# Patient Record
Sex: FEMALE | Race: WHITE | NOT HISPANIC OR LATINO
[De-identification: names, ages, dates, MRNs, and addresses within clinical notes are randomized per-mention and may not be internally consistent; named-entity substitution may affect disease eponyms.]

---

## 2020-08-19 ENCOUNTER — FORM ENCOUNTER (OUTPATIENT)
Age: 20
End: 2020-08-19

## 2020-09-30 ENCOUNTER — FORM ENCOUNTER (OUTPATIENT)
Age: 20
End: 2020-09-30

## 2021-05-18 PROBLEM — Z00.00 ENCOUNTER FOR PREVENTIVE HEALTH EXAMINATION: Status: ACTIVE | Noted: 2021-05-18

## 2021-06-16 ENCOUNTER — APPOINTMENT (OUTPATIENT)
Dept: BREAST CENTER | Facility: CLINIC | Age: 21
End: 2021-06-16
Payer: COMMERCIAL

## 2021-06-16 VITALS
DIASTOLIC BLOOD PRESSURE: 68 MMHG | WEIGHT: 118 LBS | TEMPERATURE: 98 F | BODY MASS INDEX: 21.71 KG/M2 | SYSTOLIC BLOOD PRESSURE: 100 MMHG | HEIGHT: 62 IN

## 2021-06-16 DIAGNOSIS — D24.2 BENIGN NEOPLASM OF LEFT BREAST: ICD-10-CM

## 2021-06-16 DIAGNOSIS — Z78.9 OTHER SPECIFIED HEALTH STATUS: ICD-10-CM

## 2021-06-16 PROCEDURE — 99203 OFFICE O/P NEW LOW 30 MIN: CPT

## 2021-06-17 PROBLEM — Z78.9 NO PERTINENT PAST SURGICAL HISTORY: Status: RESOLVED | Noted: 2021-06-17 | Resolved: 2021-06-17

## 2021-06-17 PROBLEM — Z78.9 NO PERTINENT PAST MEDICAL HISTORY: Status: RESOLVED | Noted: 2021-06-17 | Resolved: 2021-06-17

## 2021-06-17 NOTE — PHYSICAL EXAM
[Normocephalic] : normocephalic [Atraumatic] : atraumatic [EOMI] : extra ocular movement intact [No Supraclavicular Adenopathy] : no supraclavicular adenopathy [No Cervical Adenopathy] : no cervical adenopathy [No dominant masses] : no dominant masses in right breast  [No Nipple Retraction] : no left nipple retraction [No Nipple Discharge] : no left nipple discharge [No Axillary Lymphadenopathy] : no left axillary lymphadenopathy [Soft] : abdomen soft [Not Tender] : non-tender [No Edema] : no edema [No Rashes] : no rashes [No Ulceration] : no ulceration [de-identified] : no suspicious abnormalities  [de-identified] : in the LIQ, she has a 1.5 cm mobile mass which is likely her biopsy proven fibroadenoma

## 2021-06-17 NOTE — HISTORY OF PRESENT ILLNESS
[FreeTextEntry1] :  Jennifer is a 20F with a left breast fibroadenoma. \par \par She first palpated a left breast mass about 1 year prior.  It does not cause her pain, it has not changed in size and there are no overlying skin changes.  She does not feel any abnormalities within her right breast and otherwise denies any other breast related complaints. \par \par Her work up was as follows: \par 2020 -- L breast US \par -@4N2, 2 x 0.5 x 2 cm mass \par BIRADS 4 \par \par 2020 -- L US CNBx @4N2 \par -fibroadenoma \par \par HISTORICAL RISK FACTORS: \par -no prior breast biopsies or surgeries \par -no family history of breast or ovarian cancer \par -\par -no prior OCP use \par -no gyn surgeries\par

## 2021-06-17 NOTE — PAST MEDICAL HISTORY
[Menstruating] : The patient is menstruating [Menarche Age ____] : age at menarche was [unfilled] [Definite ___ (Date)] : the last menstrual period was [unfilled] [Total Preg ___] : G[unfilled] [History of Hormone Replacement Treatment] : has no history of hormone replacement treatment [FreeTextEntry5] : denies  [FreeTextEntry6] : denies [FreeTextEntry8] : n/a  [FreeTextEntry7] : denies

## 2021-06-17 NOTE — REVIEW OF SYSTEMS
[As Noted in HPI] : as noted in HPI [Breast Lump] : breast lump [Negative] : Heme/Lymph [Fever] : no fever [Chills] : no chills [Skin Lesions] : no skin lesions [Skin Wound] : no skin wound [Breast Pain] : no breast pain

## 2021-06-17 NOTE — ASSESSMENT
[FreeTextEntry1] : Jennifer is a 20 F left breast fibroadenoma. \par \par On exam, in her left breast, in the LIQ, she has a 1.5 cm mobile mass which is likely her biopsy proven fibroadenoma, but no other suspicious abnormalities were palpated. \par \par Her most recent imaging was a b/l breast US on 8/20/2020 which revealed in the left breast @4N2, an oval hypoechoic mass measuring 2 x 0.5 x 2 cm which was her biopsy proven fibroadenoma. \par \par She will be due for a L breast US on 8/20/2021.  This will be scheduled for her today.  If unrevealing, she can follow up in 1 year for a CBE. \par \par We discussed fibroadenomas.  These are benign lesions without any malignant potential.  They are hormonally influenced and can increase or decrease in size and can also regress spontaneously.  They are considered proliferative lesions without atypia.  Patients with these lesions have been found to have a slightly increased relative risk of breast cancer compared to the reference population.  Surgical excision is recommended when the diagnosis in unclear, the lesion is causing pain or breast deformity, or if it is rapidly enlarging. \par \par The reason that we recommend surgical excision for a rapidly enlarging fibroadenoma is because there is a possibility that this could be a Phyllodes Tumor.  The treatment of this lesion is wide local excision with 1 cm margins.  A sentinel lymph node would not be necessary as this disease very rarely spreads to the lymph nodes. \par \par At this time, she is asymptomatic from her fibroadenoma, and on exam, it does appear to be slightly smaller than what was measured on US, so no acute surgical intervention is indicated. \par \par We will continue to monitor this. \par \par She is otherwise at an average risk for breast cancer and should start annual screening mammograms at the age of 40. \par \par All of her questions were answered.  She knows to call with any further questions or concerns. \par \par PLAN: \par -L breast US on 8/20/2021 \par -if unrevealing, f/up in 1 year

## 2022-03-31 ENCOUNTER — NON-APPOINTMENT (OUTPATIENT)
Age: 22
End: 2022-03-31

## 2022-03-31 ENCOUNTER — APPOINTMENT (OUTPATIENT)
Dept: OBGYN | Facility: CLINIC | Age: 22
End: 2022-03-31
Payer: COMMERCIAL

## 2022-03-31 VITALS
TEMPERATURE: 98.8 F | BODY MASS INDEX: 23 KG/M2 | SYSTOLIC BLOOD PRESSURE: 135 MMHG | HEART RATE: 111 BPM | HEIGHT: 62 IN | DIASTOLIC BLOOD PRESSURE: 81 MMHG | WEIGHT: 125 LBS

## 2022-03-31 DIAGNOSIS — Z01.411 ENCOUNTER FOR GYNECOLOGICAL EXAMINATION (GENERAL) (ROUTINE) WITH ABNORMAL FINDINGS: ICD-10-CM

## 2022-03-31 DIAGNOSIS — N94.4 PRIMARY DYSMENORRHEA: ICD-10-CM

## 2022-03-31 LAB
BILIRUB UR QL STRIP: NEGATIVE
CLARITY UR: CLEAR
COLLECTION METHOD: NORMAL
GLUCOSE UR-MCNC: NEGATIVE
HCG UR QL: 0.2 EU/DL
HGB UR QL STRIP.AUTO: NORMAL
KETONES UR-MCNC: NEGATIVE
LEUKOCYTE ESTERASE UR QL STRIP: NEGATIVE
NITRITE UR QL STRIP: NEGATIVE
PH UR STRIP: 6
PROT UR STRIP-MCNC: NEGATIVE
SP GR UR STRIP: 1.01

## 2022-03-31 PROCEDURE — 81003 URINALYSIS AUTO W/O SCOPE: CPT | Mod: QW

## 2022-03-31 PROCEDURE — 99395 PREV VISIT EST AGE 18-39: CPT

## 2022-03-31 NOTE — HISTORY OF PRESENT ILLNESS
[Definite ___ (Date)] : the last menstrual period was [unfilled] [Normal Amount/Duration] : it was of a normal amount and duration [Irregular Cycle Intervals] : are  irregular [Regular Duration] : has been regular [Dysmenorrhea] : dysmenorrhea [Condoms] : uses condoms [Y] : Patient is sexually active [N] : Patient denies prior pregnancies [TextBox_102] : skipped 2 months   see HPI [LMPDate] : 3/21/22 [MensesFreq] : 28 [MensesLength] : 5 [MensesAmount] : mod [Menstrual Cramps] : menstrual cramps [FreeTextEntry1] : 3/21/22 [No] : Patient does not have concerns regarding sex [Currently Active] : currently active [Men] : men

## 2022-03-31 NOTE — DISCUSSION/SUMMARY
[FreeTextEntry1] : pt does not meet the criteria for PCOS sonographically or on PE\par she does not want to start OCPs to manage her dysmenorrhea/irreg cycles\par recommended ibuprofen 600-800mg q 8 hrs prn for pain\par pt will f/u if no improvement with NSAIDs

## 2022-03-31 NOTE — PROCEDURE
[Cervical Pap Smear] : cervical Pap smear [Liquid Base] : liquid base [GC & Chlamydia via Pap] : GC & Chlamydia via Pap [Tolerated Well] : the patient tolerated the procedure well [No Complications] : there were no complications [Abnormal Uterine Bleeding] : abnormal uterine bleeding [Transvaginal Ultrasound] : transvaginal ultrasound [Anteverted] : anteverted [FreeTextEntry9] : skipped 2 months [FreeTextEntry5] : 35.92cc [FreeTextEntry7] : 4.25cc [FreeTextEntry8] : 4.80cc [FreeTextEntry4] : no evidence of PCOS

## 2022-04-05 LAB
C TRACH RRNA SPEC QL NAA+PROBE: NOT DETECTED
HPV HIGH+LOW RISK DNA PNL CVX: NOT DETECTED
N GONORRHOEA RRNA SPEC QL NAA+PROBE: NOT DETECTED
SOURCE AMPLIFICATION: NORMAL

## 2022-04-11 LAB — CYTOLOGY CVX/VAG DOC THIN PREP: NORMAL

## 2023-11-01 ENCOUNTER — NON-APPOINTMENT (OUTPATIENT)
Age: 23
End: 2023-11-01